# Patient Record
Sex: FEMALE | Race: OTHER | Employment: OTHER | URBAN - METROPOLITAN AREA
[De-identification: names, ages, dates, MRNs, and addresses within clinical notes are randomized per-mention and may not be internally consistent; named-entity substitution may affect disease eponyms.]

---

## 2017-01-11 ENCOUNTER — FOLLOW UP (OUTPATIENT)
Dept: URBAN - METROPOLITAN AREA CLINIC 33 | Facility: CLINIC | Age: 82
End: 2017-01-11

## 2017-01-11 VITALS
SYSTOLIC BLOOD PRESSURE: 130 MMHG | WEIGHT: 128 LBS | DIASTOLIC BLOOD PRESSURE: 84 MMHG | BODY MASS INDEX: 22.97 KG/M2 | HEART RATE: 78 BPM | HEIGHT: 62.5 IN

## 2017-01-11 DIAGNOSIS — H01.003: ICD-10-CM

## 2017-01-11 DIAGNOSIS — H40.1130: ICD-10-CM

## 2017-01-11 DIAGNOSIS — H01.006: ICD-10-CM

## 2017-01-11 DIAGNOSIS — H43.393: ICD-10-CM

## 2017-01-11 DIAGNOSIS — H35.3133: ICD-10-CM

## 2017-01-11 PROCEDURE — G8420 CALC BMI NORM PARAMETERS: HCPCS

## 2017-01-11 PROCEDURE — 4177F TALK PT/CRGVR RE AREDS PREV: CPT

## 2017-01-11 PROCEDURE — 92235 FLUORESCEIN ANGRPH MLTIFRAME: CPT

## 2017-01-11 PROCEDURE — G8427 DOCREV CUR MEDS BY ELIG CLIN: HCPCS

## 2017-01-11 PROCEDURE — 92250 FUNDUS PHOTOGRAPHY W/I&R: CPT

## 2017-01-11 PROCEDURE — 2027F OPTIC NERVE HEAD EVAL DONE: CPT

## 2017-01-11 PROCEDURE — 92014 COMPRE OPH EXAM EST PT 1/>: CPT

## 2017-01-11 PROCEDURE — 2019F DILATED MACUL EXAM DONE: CPT

## 2017-01-11 PROCEDURE — 1036F TOBACCO NON-USER: CPT

## 2017-01-11 ASSESSMENT — TONOMETRY
OS_IOP_MMHG: 11
OD_IOP_MMHG: 13

## 2017-01-11 ASSESSMENT — VISUAL ACUITY
OD_CC: 20/20-1
OS_CC: 20/30-1

## 2017-03-29 ENCOUNTER — UNSCHEDULED FOLLOW UP (OUTPATIENT)
Dept: URBAN - METROPOLITAN AREA CLINIC 33 | Facility: CLINIC | Age: 82
End: 2017-03-29

## 2017-03-29 VITALS
BODY MASS INDEX: 23 KG/M2 | HEIGHT: 62 IN | HEART RATE: 72 BPM | SYSTOLIC BLOOD PRESSURE: 108 MMHG | WEIGHT: 125 LBS | DIASTOLIC BLOOD PRESSURE: 72 MMHG

## 2017-03-29 DIAGNOSIS — H35.3211: ICD-10-CM

## 2017-03-29 DIAGNOSIS — H35.3123: ICD-10-CM

## 2017-03-29 DIAGNOSIS — H01.006: ICD-10-CM

## 2017-03-29 DIAGNOSIS — H01.003: ICD-10-CM

## 2017-03-29 DIAGNOSIS — H43.393: ICD-10-CM

## 2017-03-29 DIAGNOSIS — H40.1130: ICD-10-CM

## 2017-03-29 PROCEDURE — 92014 COMPRE OPH EXAM EST PT 1/>: CPT

## 2017-03-29 PROCEDURE — 4177F TALK PT/CRGVR RE AREDS PREV: CPT

## 2017-03-29 PROCEDURE — 92134 CPTRZ OPH DX IMG PST SGM RTA: CPT

## 2017-03-29 PROCEDURE — G8427 DOCREV CUR MEDS BY ELIG CLIN: HCPCS

## 2017-03-29 PROCEDURE — 2027F OPTIC NERVE HEAD EVAL DONE: CPT

## 2017-03-29 PROCEDURE — G8420 CALC BMI NORM PARAMETERS: HCPCS

## 2017-03-29 PROCEDURE — 67028 INJECTION EYE DRUG: CPT

## 2017-03-29 PROCEDURE — 2019F DILATED MACUL EXAM DONE: CPT

## 2017-03-29 PROCEDURE — 1036F TOBACCO NON-USER: CPT

## 2017-03-29 ASSESSMENT — TONOMETRY
OS_IOP_MMHG: 13
OD_IOP_MMHG: 14

## 2017-03-29 ASSESSMENT — VISUAL ACUITY
OD_CC: 20/40+1
OS_CC: 20/40+2

## 2017-04-10 ENCOUNTER — UNSCHEDULED FOLLOW UP (OUTPATIENT)
Dept: URBAN - METROPOLITAN AREA CLINIC 33 | Facility: CLINIC | Age: 82
End: 2017-04-10

## 2017-04-10 VITALS — HEIGHT: 55 IN | SYSTOLIC BLOOD PRESSURE: 132 MMHG | HEART RATE: 68 BPM | DIASTOLIC BLOOD PRESSURE: 64 MMHG

## 2017-04-10 DIAGNOSIS — H40.1130: ICD-10-CM

## 2017-04-10 DIAGNOSIS — H43.393: ICD-10-CM

## 2017-04-10 DIAGNOSIS — H35.3211: ICD-10-CM

## 2017-04-10 DIAGNOSIS — H35.3123: ICD-10-CM

## 2017-04-10 PROCEDURE — 1036F TOBACCO NON-USER: CPT

## 2017-04-10 PROCEDURE — 2027F OPTIC NERVE HEAD EVAL DONE: CPT

## 2017-04-10 PROCEDURE — 4177F TALK PT/CRGVR RE AREDS PREV: CPT

## 2017-04-10 PROCEDURE — G8427 DOCREV CUR MEDS BY ELIG CLIN: HCPCS

## 2017-04-10 PROCEDURE — 2019F DILATED MACUL EXAM DONE: CPT

## 2017-04-10 PROCEDURE — 92012 INTRM OPH EXAM EST PATIENT: CPT

## 2017-04-10 PROCEDURE — 92134 CPTRZ OPH DX IMG PST SGM RTA: CPT

## 2017-04-10 PROCEDURE — 92226 OPHTHALMOSCOPY (SUB): CPT

## 2017-04-10 ASSESSMENT — TONOMETRY
OD_IOP_MMHG: 12
OS_IOP_MMHG: 14

## 2017-04-10 ASSESSMENT — VISUAL ACUITY
OD_CC: 20/25-1
OS_CC: 20/40-2

## 2017-05-03 ENCOUNTER — FOLLOW UP (OUTPATIENT)
Dept: URBAN - METROPOLITAN AREA CLINIC 33 | Facility: CLINIC | Age: 82
End: 2017-05-03

## 2017-05-03 VITALS — HEART RATE: 76 BPM | DIASTOLIC BLOOD PRESSURE: 80 MMHG | HEIGHT: 55 IN | SYSTOLIC BLOOD PRESSURE: 110 MMHG

## 2017-05-03 DIAGNOSIS — H43.393: ICD-10-CM

## 2017-05-03 DIAGNOSIS — H01.003: ICD-10-CM

## 2017-05-03 DIAGNOSIS — H35.3123: ICD-10-CM

## 2017-05-03 DIAGNOSIS — H35.3211: ICD-10-CM

## 2017-05-03 DIAGNOSIS — H40.1130: ICD-10-CM

## 2017-05-03 DIAGNOSIS — H01.006: ICD-10-CM

## 2017-05-03 DIAGNOSIS — H25.11: ICD-10-CM

## 2017-05-03 PROCEDURE — 67028 INJECTION EYE DRUG: CPT

## 2017-05-03 PROCEDURE — 4177F TALK PT/CRGVR RE AREDS PREV: CPT

## 2017-05-03 PROCEDURE — 92012 INTRM OPH EXAM EST PATIENT: CPT

## 2017-05-03 PROCEDURE — 92235 FLUORESCEIN ANGRPH MLTIFRAME: CPT

## 2017-05-03 PROCEDURE — 92250 FUNDUS PHOTOGRAPHY W/I&R: CPT

## 2017-05-03 PROCEDURE — 1036F TOBACCO NON-USER: CPT

## 2017-05-03 PROCEDURE — G8427 DOCREV CUR MEDS BY ELIG CLIN: HCPCS

## 2017-05-03 PROCEDURE — 2019F DILATED MACUL EXAM DONE: CPT

## 2017-05-03 PROCEDURE — 2027F OPTIC NERVE HEAD EVAL DONE: CPT

## 2017-05-03 ASSESSMENT — TONOMETRY
OS_IOP_MMHG: 12
OD_IOP_MMHG: 12

## 2017-05-03 ASSESSMENT — VISUAL ACUITY
OS_CC: 20/40-1
OD_CC: 20/40+1

## 2017-11-01 ENCOUNTER — FOLLOW UP (OUTPATIENT)
Dept: URBAN - METROPOLITAN AREA CLINIC 33 | Facility: CLINIC | Age: 82
End: 2017-11-01

## 2017-11-01 VITALS — HEART RATE: 80 BPM | DIASTOLIC BLOOD PRESSURE: 74 MMHG | HEIGHT: 55 IN | SYSTOLIC BLOOD PRESSURE: 117 MMHG

## 2017-11-01 DIAGNOSIS — H35.3123: ICD-10-CM

## 2017-11-01 DIAGNOSIS — H25.11: ICD-10-CM

## 2017-11-01 DIAGNOSIS — H01.006: ICD-10-CM

## 2017-11-01 DIAGNOSIS — H35.3211: ICD-10-CM

## 2017-11-01 DIAGNOSIS — H01.003: ICD-10-CM

## 2017-11-01 DIAGNOSIS — H43.393: ICD-10-CM

## 2017-11-01 DIAGNOSIS — H40.1130: ICD-10-CM

## 2017-11-01 PROCEDURE — 2019F DILATED MACUL EXAM DONE: CPT

## 2017-11-01 PROCEDURE — 1036F TOBACCO NON-USER: CPT

## 2017-11-01 PROCEDURE — G8427 DOCREV CUR MEDS BY ELIG CLIN: HCPCS

## 2017-11-01 PROCEDURE — 2027F OPTIC NERVE HEAD EVAL DONE: CPT

## 2017-11-01 PROCEDURE — 4177F TALK PT/CRGVR RE AREDS PREV: CPT

## 2017-11-01 PROCEDURE — 67028 INJECTION EYE DRUG: CPT

## 2017-11-01 PROCEDURE — G8420 CALC BMI NORM PARAMETERS: HCPCS

## 2017-11-01 PROCEDURE — 92134 CPTRZ OPH DX IMG PST SGM RTA: CPT

## 2017-11-01 PROCEDURE — 92014 COMPRE OPH EXAM EST PT 1/>: CPT

## 2017-11-01 ASSESSMENT — VISUAL ACUITY
OS_SC: 20/40
OD_SC: 20/40-2

## 2017-11-01 ASSESSMENT — TONOMETRY
OS_IOP_MMHG: 13
OD_IOP_MMHG: 14

## 2017-11-14 ENCOUNTER — UNSCHEDULED FOLLOW UP (OUTPATIENT)
Dept: URBAN - METROPOLITAN AREA CLINIC 33 | Facility: CLINIC | Age: 82
End: 2017-11-14

## 2017-11-14 DIAGNOSIS — H01.003: ICD-10-CM

## 2017-11-14 DIAGNOSIS — H25.11: ICD-10-CM

## 2017-11-14 DIAGNOSIS — H43.393: ICD-10-CM

## 2017-11-14 DIAGNOSIS — H35.3211: ICD-10-CM

## 2017-11-14 DIAGNOSIS — H35.3123: ICD-10-CM

## 2017-11-14 DIAGNOSIS — H40.1130: ICD-10-CM

## 2017-11-14 DIAGNOSIS — H01.006: ICD-10-CM

## 2017-11-14 PROCEDURE — 4177F TALK PT/CRGVR RE AREDS PREV: CPT

## 2017-11-14 PROCEDURE — G8420 CALC BMI NORM PARAMETERS: HCPCS

## 2017-11-14 PROCEDURE — 92012 INTRM OPH EXAM EST PATIENT: CPT

## 2017-11-14 PROCEDURE — 1036F TOBACCO NON-USER: CPT

## 2017-11-14 PROCEDURE — 2027F OPTIC NERVE HEAD EVAL DONE: CPT

## 2017-11-14 PROCEDURE — G8427 DOCREV CUR MEDS BY ELIG CLIN: HCPCS

## 2017-11-14 PROCEDURE — 2019F DILATED MACUL EXAM DONE: CPT

## 2017-11-14 ASSESSMENT — VISUAL ACUITY
OS_SC: 20/40+1
OD_SC: 20/40-2

## 2017-11-14 ASSESSMENT — TONOMETRY
OS_IOP_MMHG: 11
OD_IOP_MMHG: 10

## 2017-11-15 ENCOUNTER — UNSCHEDULED FOLLOW UP (OUTPATIENT)
Dept: URBAN - METROPOLITAN AREA CLINIC 33 | Facility: CLINIC | Age: 82
End: 2017-11-15

## 2017-11-15 VITALS — SYSTOLIC BLOOD PRESSURE: 114 MMHG | HEIGHT: 55 IN | HEART RATE: 72 BPM | DIASTOLIC BLOOD PRESSURE: 60 MMHG

## 2017-11-15 DIAGNOSIS — H35.3211: ICD-10-CM

## 2017-11-15 DIAGNOSIS — H35.3123: ICD-10-CM

## 2017-11-15 DIAGNOSIS — H01.006: ICD-10-CM

## 2017-11-15 DIAGNOSIS — H25.11: ICD-10-CM

## 2017-11-15 DIAGNOSIS — H01.003: ICD-10-CM

## 2017-11-15 DIAGNOSIS — H40.1130: ICD-10-CM

## 2017-11-15 DIAGNOSIS — H43.393: ICD-10-CM

## 2017-11-15 PROCEDURE — G8420 CALC BMI NORM PARAMETERS: HCPCS

## 2017-11-15 PROCEDURE — 2027F OPTIC NERVE HEAD EVAL DONE: CPT

## 2017-11-15 PROCEDURE — 4177F TALK PT/CRGVR RE AREDS PREV: CPT

## 2017-11-15 PROCEDURE — G8427 DOCREV CUR MEDS BY ELIG CLIN: HCPCS

## 2017-11-15 PROCEDURE — 1036F TOBACCO NON-USER: CPT

## 2017-11-15 PROCEDURE — 2019F DILATED MACUL EXAM DONE: CPT

## 2017-11-15 PROCEDURE — 99212 OFFICE O/P EST SF 10 MIN: CPT

## 2017-11-15 ASSESSMENT — VISUAL ACUITY
OS_SC: 20/30+1
OD_SC: 20/30

## 2018-01-17 ENCOUNTER — FOLLOW UP (OUTPATIENT)
Dept: URBAN - METROPOLITAN AREA CLINIC 33 | Facility: CLINIC | Age: 83
End: 2018-01-17

## 2018-01-17 VITALS
HEIGHT: 62 IN | WEIGHT: 125 LBS | DIASTOLIC BLOOD PRESSURE: 74 MMHG | BODY MASS INDEX: 23 KG/M2 | SYSTOLIC BLOOD PRESSURE: 126 MMHG

## 2018-01-17 DIAGNOSIS — H01.006: ICD-10-CM

## 2018-01-17 DIAGNOSIS — H40.1130: ICD-10-CM

## 2018-01-17 DIAGNOSIS — H43.393: ICD-10-CM

## 2018-01-17 DIAGNOSIS — H35.3123: ICD-10-CM

## 2018-01-17 DIAGNOSIS — H25.11: ICD-10-CM

## 2018-01-17 DIAGNOSIS — H35.3211: ICD-10-CM

## 2018-01-17 DIAGNOSIS — H01.003: ICD-10-CM

## 2018-01-17 PROCEDURE — 92014 COMPRE OPH EXAM EST PT 1/>: CPT

## 2018-01-17 PROCEDURE — 67028 INJECTION EYE DRUG: CPT

## 2018-01-17 PROCEDURE — 92235 FLUORESCEIN ANGRPH MLTIFRAME: CPT

## 2018-01-17 PROCEDURE — 92134 CPTRZ OPH DX IMG PST SGM RTA: CPT

## 2018-01-17 PROCEDURE — 92250 FUNDUS PHOTOGRAPHY W/I&R: CPT

## 2018-01-17 RX ORDER — PREDNISONE 20MG 20 MG/1: 1 TABLET ORAL ONCE A DAY

## 2018-01-17 ASSESSMENT — VISUAL ACUITY
OD_SC: 20/40+1
OS_SC: 20/30-2

## 2018-01-17 ASSESSMENT — TONOMETRY
OD_IOP_MMHG: 10
OS_IOP_MMHG: 11

## 2018-03-28 ENCOUNTER — FOLLOW UP AND POST INJECTION EVALUATION (OUTPATIENT)
Dept: URBAN - METROPOLITAN AREA CLINIC 33 | Facility: CLINIC | Age: 83
End: 2018-03-28

## 2018-03-28 VITALS — HEIGHT: 55 IN | SYSTOLIC BLOOD PRESSURE: 118 MMHG | DIASTOLIC BLOOD PRESSURE: 70 MMHG | HEART RATE: 68 BPM

## 2018-03-28 DIAGNOSIS — H35.3123: ICD-10-CM

## 2018-03-28 DIAGNOSIS — H43.393: ICD-10-CM

## 2018-03-28 DIAGNOSIS — H40.1130: ICD-10-CM

## 2018-03-28 DIAGNOSIS — H10.33: ICD-10-CM

## 2018-03-28 DIAGNOSIS — H35.3211: ICD-10-CM

## 2018-03-28 PROCEDURE — 92250 FUNDUS PHOTOGRAPHY W/I&R: CPT

## 2018-03-28 PROCEDURE — 92012 INTRM OPH EXAM EST PATIENT: CPT

## 2018-03-28 PROCEDURE — 92134 CPTRZ OPH DX IMG PST SGM RTA: CPT

## 2018-03-28 PROCEDURE — 67028 INJECTION EYE DRUG: CPT

## 2018-03-28 ASSESSMENT — VISUAL ACUITY
OS_SC: 20/30+1
OD_PH: 20/40+1
OD_SC: 20/50-2

## 2018-03-28 ASSESSMENT — TONOMETRY
OS_IOP_MMHG: 11
OD_IOP_MMHG: 13

## 2018-04-02 ENCOUNTER — UNSCHEDULED FOLLOW UP (OUTPATIENT)
Dept: URBAN - METROPOLITAN AREA CLINIC 33 | Facility: CLINIC | Age: 83
End: 2018-04-02

## 2018-04-02 DIAGNOSIS — H35.3123: ICD-10-CM

## 2018-04-02 DIAGNOSIS — H35.3211: ICD-10-CM

## 2018-04-02 DIAGNOSIS — H40.1130: ICD-10-CM

## 2018-04-02 DIAGNOSIS — H43.393: ICD-10-CM

## 2018-04-02 PROCEDURE — 92226 OPHTHALMOSCOPY (SUB): CPT

## 2018-04-02 PROCEDURE — 92012 INTRM OPH EXAM EST PATIENT: CPT

## 2018-04-02 ASSESSMENT — TONOMETRY
OD_IOP_MMHG: 11
OS_IOP_MMHG: 11

## 2018-04-02 ASSESSMENT — VISUAL ACUITY
OS_SC: 20/40+1
OD_SC: 20/50+2

## 2018-05-02 ENCOUNTER — FOLLOW UP AND POST INJECTION EVALUATION (OUTPATIENT)
Dept: URBAN - METROPOLITAN AREA CLINIC 33 | Facility: CLINIC | Age: 83
End: 2018-05-02

## 2018-05-02 VITALS — HEART RATE: 69 BPM | HEIGHT: 55 IN | SYSTOLIC BLOOD PRESSURE: 115 MMHG | DIASTOLIC BLOOD PRESSURE: 76 MMHG

## 2018-05-02 DIAGNOSIS — H40.1130: ICD-10-CM

## 2018-05-02 DIAGNOSIS — H43.393: ICD-10-CM

## 2018-05-02 DIAGNOSIS — H35.3123: ICD-10-CM

## 2018-05-02 DIAGNOSIS — H35.3211: ICD-10-CM

## 2018-05-02 PROCEDURE — 92134 CPTRZ OPH DX IMG PST SGM RTA: CPT

## 2018-05-02 PROCEDURE — 92250 FUNDUS PHOTOGRAPHY W/I&R: CPT

## 2018-05-02 PROCEDURE — 92014 COMPRE OPH EXAM EST PT 1/>: CPT

## 2018-05-02 PROCEDURE — 67028 INJECTION EYE DRUG: CPT

## 2018-05-02 ASSESSMENT — TONOMETRY
OS_IOP_MMHG: 12
OD_IOP_MMHG: 10

## 2018-05-02 ASSESSMENT — VISUAL ACUITY
OD_SC: 20/40-2
OS_SC: 20/40-2

## 2018-11-05 ENCOUNTER — FOLLOW UP AND POST INJECTION EVALUATION (OUTPATIENT)
Dept: URBAN - METROPOLITAN AREA CLINIC 33 | Facility: CLINIC | Age: 83
End: 2018-11-05

## 2018-11-05 DIAGNOSIS — H40.1130: ICD-10-CM

## 2018-11-05 DIAGNOSIS — H35.3211: ICD-10-CM

## 2018-11-05 DIAGNOSIS — H01.003: ICD-10-CM

## 2018-11-05 DIAGNOSIS — H43.393: ICD-10-CM

## 2018-11-05 DIAGNOSIS — H25.11: ICD-10-CM

## 2018-11-05 DIAGNOSIS — H04.123: ICD-10-CM

## 2018-11-05 DIAGNOSIS — H01.006: ICD-10-CM

## 2018-11-05 DIAGNOSIS — H35.3123: ICD-10-CM

## 2018-11-05 PROCEDURE — 67028 INJECTION EYE DRUG: CPT

## 2018-11-05 PROCEDURE — 92134 CPTRZ OPH DX IMG PST SGM RTA: CPT

## 2018-11-05 PROCEDURE — 92250 FUNDUS PHOTOGRAPHY W/I&R: CPT

## 2018-11-05 PROCEDURE — 92014 COMPRE OPH EXAM EST PT 1/>: CPT

## 2018-11-05 ASSESSMENT — VISUAL ACUITY
OD_PH: 20/50+2
OS_CC: 20/30+1
OD_CC: 20/100-2

## 2018-11-05 ASSESSMENT — TONOMETRY
OD_IOP_MMHG: 11
OS_IOP_MMHG: 12

## 2018-11-07 ENCOUNTER — UNSCHEDULED FOLLOW UP (OUTPATIENT)
Dept: URBAN - METROPOLITAN AREA CLINIC 33 | Facility: CLINIC | Age: 83
End: 2018-11-07

## 2018-11-07 DIAGNOSIS — H40.1130: ICD-10-CM

## 2018-11-07 DIAGNOSIS — H43.393: ICD-10-CM

## 2018-11-07 DIAGNOSIS — H35.3123: ICD-10-CM

## 2018-11-07 DIAGNOSIS — H10.13: ICD-10-CM

## 2018-11-07 DIAGNOSIS — H35.3211: ICD-10-CM

## 2018-11-07 DIAGNOSIS — H11.31: ICD-10-CM

## 2018-11-07 PROCEDURE — 92012 INTRM OPH EXAM EST PATIENT: CPT

## 2018-11-07 ASSESSMENT — VISUAL ACUITY
OD_SC: 20/200+2
OS_SC: 20/50+2

## 2018-11-12 ENCOUNTER — IOP CHECK (OUTPATIENT)
Dept: URBAN - METROPOLITAN AREA CLINIC 33 | Facility: CLINIC | Age: 83
End: 2018-11-12

## 2018-11-12 DIAGNOSIS — H10.13: ICD-10-CM

## 2018-11-12 DIAGNOSIS — H43.393: ICD-10-CM

## 2018-11-12 DIAGNOSIS — H40.1130: ICD-10-CM

## 2018-11-12 DIAGNOSIS — H11.31: ICD-10-CM

## 2018-11-12 DIAGNOSIS — H35.3211: ICD-10-CM

## 2018-11-12 DIAGNOSIS — H35.3123: ICD-10-CM

## 2018-11-12 PROCEDURE — 99211 OFF/OP EST MAY X REQ PHY/QHP: CPT

## 2018-11-12 ASSESSMENT — VISUAL ACUITY
OS_CC: 20/40+2
OD_CC: 20/100+2

## 2019-02-13 ENCOUNTER — FOLLOW UP AND POST INJECTION EVALUATION (OUTPATIENT)
Dept: URBAN - METROPOLITAN AREA CLINIC 33 | Facility: CLINIC | Age: 84
End: 2019-02-13

## 2019-02-13 VITALS
HEIGHT: 62 IN | BODY MASS INDEX: 22.08 KG/M2 | DIASTOLIC BLOOD PRESSURE: 82 MMHG | WEIGHT: 120 LBS | HEART RATE: 70 BPM | SYSTOLIC BLOOD PRESSURE: 130 MMHG

## 2019-02-13 DIAGNOSIS — H35.3123: ICD-10-CM

## 2019-02-13 DIAGNOSIS — H40.1130: ICD-10-CM

## 2019-02-13 DIAGNOSIS — H35.3211: ICD-10-CM

## 2019-02-13 DIAGNOSIS — H10.13: ICD-10-CM

## 2019-02-13 DIAGNOSIS — H43.393: ICD-10-CM

## 2019-02-13 DIAGNOSIS — H11.31: ICD-10-CM

## 2019-02-13 PROCEDURE — 92014 COMPRE OPH EXAM EST PT 1/>: CPT

## 2019-02-13 PROCEDURE — 92134 CPTRZ OPH DX IMG PST SGM RTA: CPT

## 2019-02-13 PROCEDURE — 67028 INJECTION EYE DRUG: CPT

## 2019-02-13 ASSESSMENT — TONOMETRY
OD_IOP_MMHG: 12
OS_IOP_MMHG: 12

## 2019-02-13 ASSESSMENT — VISUAL ACUITY
OD_CC: 20/25-2
OS_CC: 20/80+1

## 2019-03-27 ENCOUNTER — FOLLOW UP AND POST INJECTION EVALUATION (OUTPATIENT)
Dept: URBAN - METROPOLITAN AREA CLINIC 33 | Facility: CLINIC | Age: 84
End: 2019-03-27

## 2019-03-27 DIAGNOSIS — H35.3211: ICD-10-CM

## 2019-03-27 DIAGNOSIS — H10.13: ICD-10-CM

## 2019-03-27 DIAGNOSIS — H35.3123: ICD-10-CM

## 2019-03-27 DIAGNOSIS — H11.31: ICD-10-CM

## 2019-03-27 DIAGNOSIS — H43.393: ICD-10-CM

## 2019-03-27 DIAGNOSIS — H40.1130: ICD-10-CM

## 2019-03-27 PROCEDURE — 92134 CPTRZ OPH DX IMG PST SGM RTA: CPT

## 2019-03-27 PROCEDURE — 67028 INJECTION EYE DRUG: CPT

## 2019-03-27 PROCEDURE — 92012 INTRM OPH EXAM EST PATIENT: CPT

## 2019-03-27 ASSESSMENT — TONOMETRY
OS_IOP_MMHG: 13
OD_IOP_MMHG: 11

## 2019-03-27 ASSESSMENT — VISUAL ACUITY
OS_SC: 20/40-2
OD_PH: 20/25-1
OD_SC: 20/40+2

## 2019-05-01 ENCOUNTER — CLINICAL PROCEDURE AND DIAGNOSTIC TESTING ONLY (OUTPATIENT)
Dept: URBAN - METROPOLITAN AREA CLINIC 33 | Facility: CLINIC | Age: 84
End: 2019-05-01

## 2019-05-01 DIAGNOSIS — H35.3211: ICD-10-CM

## 2019-05-01 DIAGNOSIS — H35.3123: ICD-10-CM

## 2019-05-01 PROCEDURE — 92134 CPTRZ OPH DX IMG PST SGM RTA: CPT

## 2019-05-01 PROCEDURE — 67028 INJECTION EYE DRUG: CPT

## 2019-05-01 ASSESSMENT — TONOMETRY
OD_IOP_MMHG: 12
OS_IOP_MMHG: 12

## 2019-05-01 ASSESSMENT — VISUAL ACUITY
OD_SC: 20/30-2
OS_SC: 20/50+2

## 2019-11-04 ENCOUNTER — FOLLOW UP AND POST INJECTION EVALUATION (OUTPATIENT)
Dept: URBAN - METROPOLITAN AREA CLINIC 33 | Facility: CLINIC | Age: 84
End: 2019-11-04

## 2019-11-04 DIAGNOSIS — H43.393: ICD-10-CM

## 2019-11-04 DIAGNOSIS — H40.1130: ICD-10-CM

## 2019-11-04 DIAGNOSIS — H11.31: ICD-10-CM

## 2019-11-04 DIAGNOSIS — H35.3123: ICD-10-CM

## 2019-11-04 DIAGNOSIS — H35.3211: ICD-10-CM

## 2019-11-04 DIAGNOSIS — H10.13: ICD-10-CM

## 2019-11-04 PROCEDURE — 92014 COMPRE OPH EXAM EST PT 1/>: CPT

## 2019-11-04 PROCEDURE — 92250 FUNDUS PHOTOGRAPHY W/I&R: CPT

## 2019-11-04 PROCEDURE — 67028 INJECTION EYE DRUG: CPT

## 2019-11-04 PROCEDURE — 92134 CPTRZ OPH DX IMG PST SGM RTA: CPT

## 2019-11-04 ASSESSMENT — TONOMETRY
OS_IOP_MMHG: 10
OD_IOP_MMHG: 10

## 2019-11-04 ASSESSMENT — VISUAL ACUITY
OD_CC: 20/25-1
OS_CC: 20/50-2

## 2020-01-13 ENCOUNTER — CLINICAL PROCEDURE AND DIAGNOSTIC TESTING ONLY (OUTPATIENT)
Dept: URBAN - METROPOLITAN AREA CLINIC 33 | Facility: CLINIC | Age: 85
End: 2020-01-13

## 2020-01-13 DIAGNOSIS — H10.13: ICD-10-CM

## 2020-01-13 DIAGNOSIS — H35.3211: ICD-10-CM

## 2020-01-13 DIAGNOSIS — H11.31: ICD-10-CM

## 2020-01-13 DIAGNOSIS — H43.393: ICD-10-CM

## 2020-01-13 DIAGNOSIS — H35.3123: ICD-10-CM

## 2020-01-13 DIAGNOSIS — H40.1130: ICD-10-CM

## 2020-01-13 PROCEDURE — 92250 FUNDUS PHOTOGRAPHY W/I&R: CPT

## 2020-01-13 PROCEDURE — 92012 INTRM OPH EXAM EST PATIENT: CPT

## 2020-01-13 PROCEDURE — 92134 CPTRZ OPH DX IMG PST SGM RTA: CPT

## 2020-01-13 PROCEDURE — 67028 INJECTION EYE DRUG: CPT

## 2020-01-13 ASSESSMENT — VISUAL ACUITY
OS_CC: 20/30-2
OD_CC: 20/30+2

## 2020-01-13 ASSESSMENT — TONOMETRY
OD_IOP_MMHG: 13
OS_IOP_MMHG: 14

## 2020-02-17 ENCOUNTER — CLINICAL PROCEDURE AND DIAGNOSTIC TESTING ONLY (OUTPATIENT)
Dept: URBAN - METROPOLITAN AREA CLINIC 33 | Facility: CLINIC | Age: 85
End: 2020-02-17

## 2020-02-17 DIAGNOSIS — H35.3211: ICD-10-CM

## 2020-02-17 DIAGNOSIS — H35.3123: ICD-10-CM

## 2020-02-17 PROCEDURE — 92134 CPTRZ OPH DX IMG PST SGM RTA: CPT

## 2020-02-17 PROCEDURE — 92250 FUNDUS PHOTOGRAPHY W/I&R: CPT

## 2020-02-17 PROCEDURE — 67028 INJECTION EYE DRUG: CPT

## 2020-02-17 ASSESSMENT — VISUAL ACUITY
OS_CC: 20/30-2
OD_CC: 20/30+2

## 2020-02-17 ASSESSMENT — TONOMETRY
OS_IOP_MMHG: 12
OD_IOP_MMHG: 11

## 2020-03-23 ENCOUNTER — CLINICAL PROCEDURE AND DIAGNOSTIC TESTING ONLY (OUTPATIENT)
Dept: URBAN - METROPOLITAN AREA CLINIC 33 | Facility: CLINIC | Age: 85
End: 2020-03-23

## 2020-03-23 DIAGNOSIS — H35.3211: ICD-10-CM

## 2020-03-23 DIAGNOSIS — H35.3123: ICD-10-CM

## 2020-03-23 PROCEDURE — 67028 INJECTION EYE DRUG: CPT

## 2020-03-23 PROCEDURE — 92250 FUNDUS PHOTOGRAPHY W/I&R: CPT

## 2020-03-23 ASSESSMENT — VISUAL ACUITY
OS_CC: 20/40-2
OD_CC: 20/20-2

## 2020-03-23 ASSESSMENT — TONOMETRY
OD_IOP_MMHG: 14
OS_IOP_MMHG: 12

## 2020-04-27 ENCOUNTER — FOLLOW UP AND POST INJECTION EVALUATION (OUTPATIENT)
Dept: URBAN - METROPOLITAN AREA CLINIC 33 | Facility: CLINIC | Age: 85
End: 2020-04-27

## 2020-04-27 DIAGNOSIS — H35.3123: ICD-10-CM

## 2020-04-27 DIAGNOSIS — H11.31: ICD-10-CM

## 2020-04-27 DIAGNOSIS — H10.13: ICD-10-CM

## 2020-04-27 DIAGNOSIS — H40.1130: ICD-10-CM

## 2020-04-27 DIAGNOSIS — H43.393: ICD-10-CM

## 2020-04-27 DIAGNOSIS — H35.3211: ICD-10-CM

## 2020-04-27 PROCEDURE — 67028 INJECTION EYE DRUG: CPT

## 2020-04-27 PROCEDURE — 92250 FUNDUS PHOTOGRAPHY W/I&R: CPT

## 2020-04-27 PROCEDURE — 92134 CPTRZ OPH DX IMG PST SGM RTA: CPT

## 2020-04-27 PROCEDURE — 92012 INTRM OPH EXAM EST PATIENT: CPT

## 2020-04-27 ASSESSMENT — TONOMETRY
OS_IOP_MMHG: 11
OD_IOP_MMHG: 11

## 2020-04-27 ASSESSMENT — VISUAL ACUITY
OD_CC: 20/20-2
OS_CC: 20/40-1

## 2020-11-12 NOTE — PATIENT DISCUSSION
Advised patient this could take a few weeks to resolve. If no improvement discussed possible ocular steroid vs steroid injection.

## 2020-11-18 ENCOUNTER — FOLLOW UP AND POST INJECTION EVALUATION (OUTPATIENT)
Dept: URBAN - METROPOLITAN AREA CLINIC 33 | Facility: CLINIC | Age: 85
End: 2020-11-18

## 2020-11-18 VITALS — WEIGHT: 122 LBS | HEIGHT: 62 IN | BODY MASS INDEX: 22.45 KG/M2

## 2020-11-18 DIAGNOSIS — H35.3123: ICD-10-CM

## 2020-11-18 DIAGNOSIS — H35.3211: ICD-10-CM

## 2020-11-18 DIAGNOSIS — H43.393: ICD-10-CM

## 2020-11-18 DIAGNOSIS — H10.13: ICD-10-CM

## 2020-11-18 DIAGNOSIS — H11.31: ICD-10-CM

## 2020-11-18 DIAGNOSIS — H40.1130: ICD-10-CM

## 2020-11-18 PROCEDURE — 67028 INJECTION EYE DRUG: CPT

## 2020-11-18 PROCEDURE — 92134 CPTRZ OPH DX IMG PST SGM RTA: CPT

## 2020-11-18 PROCEDURE — 92012 INTRM OPH EXAM EST PATIENT: CPT

## 2020-11-18 PROCEDURE — 92250 FUNDUS PHOTOGRAPHY W/I&R: CPT

## 2020-11-18 ASSESSMENT — VISUAL ACUITY
OS_CC: 20/40-2
OD_CC: 20/25

## 2020-11-18 ASSESSMENT — TONOMETRY
OD_IOP_MMHG: 11
OS_IOP_MMHG: 13

## 2020-12-28 ENCOUNTER — FOLLOW UP AND POST INJECTION EVALUATION (OUTPATIENT)
Dept: URBAN - METROPOLITAN AREA CLINIC 33 | Facility: CLINIC | Age: 85
End: 2020-12-28

## 2020-12-28 DIAGNOSIS — H04.123: ICD-10-CM

## 2020-12-28 DIAGNOSIS — H10.13: ICD-10-CM

## 2020-12-28 DIAGNOSIS — H26.491: ICD-10-CM

## 2020-12-28 DIAGNOSIS — H01.003: ICD-10-CM

## 2020-12-28 DIAGNOSIS — H40.1130: ICD-10-CM

## 2020-12-28 DIAGNOSIS — H01.006: ICD-10-CM

## 2020-12-28 DIAGNOSIS — H43.393: ICD-10-CM

## 2020-12-28 DIAGNOSIS — H35.3211: ICD-10-CM

## 2020-12-28 DIAGNOSIS — H11.31: ICD-10-CM

## 2020-12-28 DIAGNOSIS — H35.3123: ICD-10-CM

## 2020-12-28 PROCEDURE — 92250 FUNDUS PHOTOGRAPHY W/I&R: CPT

## 2020-12-28 PROCEDURE — 92134 CPTRZ OPH DX IMG PST SGM RTA: CPT

## 2020-12-28 PROCEDURE — 92012 INTRM OPH EXAM EST PATIENT: CPT

## 2020-12-28 PROCEDURE — 67028 INJECTION EYE DRUG: CPT

## 2020-12-28 ASSESSMENT — TONOMETRY
OD_IOP_MMHG: 10
OS_IOP_MMHG: 12

## 2020-12-28 ASSESSMENT — VISUAL ACUITY
OD_CC: 20/40-1
OS_CC: 20/50-2

## 2021-01-28 ENCOUNTER — NEW PATIENT COMPREHENSIVE (OUTPATIENT)
Dept: URBAN - METROPOLITAN AREA CLINIC 32 | Facility: CLINIC | Age: 86
End: 2021-01-28

## 2021-01-28 DIAGNOSIS — H26.491: ICD-10-CM

## 2021-01-28 PROCEDURE — 92004 COMPRE OPH EXAM NEW PT 1/>: CPT

## 2021-01-28 ASSESSMENT — KERATOMETRY
OS_K1POWER_DIOPTERS: 47.00
OS_K2POWER_DIOPTERS: 45.25
OD_AXISANGLE_DEGREES: 21
OS_AXISANGLE2_DEGREES: 73
OD_AXISANGLE2_DEGREES: 111
OD_K1POWER_DIOPTERS: 45.50
OD_K2POWER_DIOPTERS: 44.75
OS_AXISANGLE_DEGREES: 163

## 2021-01-28 ASSESSMENT — VISUAL ACUITY
OD_CC: 20/30-1
OS_CC: 20/70
OU_CC: J1+
OD_GLARE: 20/400
OS_GLARE: 20/400

## 2021-01-28 ASSESSMENT — TONOMETRY
OD_IOP_MMHG: 14
OS_IOP_MMHG: 13

## 2021-02-01 ENCOUNTER — CLINICAL PROCEDURE AND DIAGNOSTIC TESTING ONLY (OUTPATIENT)
Dept: URBAN - METROPOLITAN AREA CLINIC 33 | Facility: CLINIC | Age: 86
End: 2021-02-01

## 2021-02-01 DIAGNOSIS — H35.3123: ICD-10-CM

## 2021-02-01 DIAGNOSIS — H35.3211: ICD-10-CM

## 2021-02-01 PROCEDURE — 92250 FUNDUS PHOTOGRAPHY W/I&R: CPT

## 2021-02-01 PROCEDURE — 92134 CPTRZ OPH DX IMG PST SGM RTA: CPT

## 2021-02-01 PROCEDURE — 67028 INJECTION EYE DRUG: CPT

## 2021-02-15 ENCOUNTER — SURGERY/PROCEDURE (OUTPATIENT)
Dept: URBAN - METROPOLITAN AREA CLINIC 32 | Facility: CLINIC | Age: 86
End: 2021-02-15

## 2021-02-15 DIAGNOSIS — H26.491: ICD-10-CM

## 2021-02-15 PROCEDURE — 66821 AFTER CATARACT LASER SURGERY: CPT

## 2021-02-18 ASSESSMENT — KERATOMETRY
OD_AXISANGLE_DEGREES: 21
OD_K1POWER_DIOPTERS: 45.50
OS_AXISANGLE_DEGREES: 163
OD_AXISANGLE2_DEGREES: 111
OS_AXISANGLE2_DEGREES: 73
OS_K2POWER_DIOPTERS: 45.25
OS_K1POWER_DIOPTERS: 47.00
OD_K2POWER_DIOPTERS: 44.75

## 2021-03-05 ASSESSMENT — VISUAL ACUITY
OD_PH: 20/25
OD_CC: 20/50
OS_CC: 20/25

## 2021-03-05 ASSESSMENT — KERATOMETRY
OS_K1POWER_DIOPTERS: 47.00
OD_AXISANGLE_DEGREES: 21
OD_K1POWER_DIOPTERS: 45.50
OD_AXISANGLE2_DEGREES: 111
OS_AXISANGLE2_DEGREES: 73
OD_K2POWER_DIOPTERS: 44.75
OS_K2POWER_DIOPTERS: 45.25
OS_AXISANGLE_DEGREES: 163

## 2021-03-08 ENCOUNTER — CLINICAL PROCEDURE AND DIAGNOSTIC TESTING ONLY (OUTPATIENT)
Dept: URBAN - METROPOLITAN AREA CLINIC 33 | Facility: CLINIC | Age: 86
End: 2021-03-08

## 2021-03-08 ENCOUNTER — YAG POST-OP (OUTPATIENT)
Dept: URBAN - METROPOLITAN AREA CLINIC 32 | Facility: CLINIC | Age: 86
End: 2021-03-08

## 2021-03-08 DIAGNOSIS — Z98.890: ICD-10-CM

## 2021-03-08 DIAGNOSIS — H35.3123: ICD-10-CM

## 2021-03-08 DIAGNOSIS — H35.3211: ICD-10-CM

## 2021-03-08 PROCEDURE — 92134 CPTRZ OPH DX IMG PST SGM RTA: CPT

## 2021-03-08 PROCEDURE — 99024 POSTOP FOLLOW-UP VISIT: CPT

## 2021-03-08 PROCEDURE — 67028 INJECTION EYE DRUG: CPT

## 2021-03-08 PROCEDURE — 92250 FUNDUS PHOTOGRAPHY W/I&R: CPT

## 2021-03-08 PROCEDURE — 92015 DETERMINE REFRACTIVE STATE: CPT

## 2021-03-08 ASSESSMENT — TONOMETRY
OS_IOP_MMHG: 13
OD_IOP_MMHG: 12
OD_IOP_MMHG: 15
OS_IOP_MMHG: 14

## 2021-04-12 ENCOUNTER — FOLLOW UP AND POST INJECTION EVALUATION (OUTPATIENT)
Dept: URBAN - METROPOLITAN AREA CLINIC 33 | Facility: CLINIC | Age: 86
End: 2021-04-12

## 2021-04-12 DIAGNOSIS — H35.3211: ICD-10-CM

## 2021-04-12 DIAGNOSIS — H35.3123: ICD-10-CM

## 2021-04-12 DIAGNOSIS — H40.1130: ICD-10-CM

## 2021-04-12 DIAGNOSIS — H43.393: ICD-10-CM

## 2021-04-12 PROCEDURE — 92250 FUNDUS PHOTOGRAPHY W/I&R: CPT

## 2021-04-12 PROCEDURE — 92012 INTRM OPH EXAM EST PATIENT: CPT

## 2021-04-12 PROCEDURE — 67028 INJECTION EYE DRUG: CPT

## 2021-04-12 PROCEDURE — 92134 CPTRZ OPH DX IMG PST SGM RTA: CPT

## 2021-04-12 RX ORDER — TIMOLOL 2.56 MG/ML: 1 SOLUTION/ DROPS OPHTHALMIC EVERY MORNING

## 2021-04-12 ASSESSMENT — VISUAL ACUITY
OD_CC: 20/30+1
OS_CC: 20/60-2

## 2021-04-12 ASSESSMENT — TONOMETRY
OS_IOP_MMHG: 14
OD_IOP_MMHG: 16

## 2021-05-17 ENCOUNTER — CLINICAL PROCEDURE AND DIAGNOSTIC TESTING ONLY (OUTPATIENT)
Dept: URBAN - METROPOLITAN AREA CLINIC 33 | Facility: CLINIC | Age: 86
End: 2021-05-17

## 2021-05-17 DIAGNOSIS — H35.3123: ICD-10-CM

## 2021-05-17 DIAGNOSIS — H35.3211: ICD-10-CM

## 2021-05-17 PROCEDURE — 92250 FUNDUS PHOTOGRAPHY W/I&R: CPT

## 2021-05-17 PROCEDURE — 67028 INJECTION EYE DRUG: CPT

## 2021-05-17 PROCEDURE — 92134 CPTRZ OPH DX IMG PST SGM RTA: CPT

## 2021-12-15 ENCOUNTER — FOLLOW UP (OUTPATIENT)
Dept: URBAN - METROPOLITAN AREA CLINIC 33 | Facility: CLINIC | Age: 86
End: 2021-12-15

## 2021-12-15 DIAGNOSIS — H40.1130: ICD-10-CM

## 2021-12-15 DIAGNOSIS — H10.13: ICD-10-CM

## 2021-12-15 DIAGNOSIS — H35.3123: ICD-10-CM

## 2021-12-15 DIAGNOSIS — H43.393: ICD-10-CM

## 2021-12-15 DIAGNOSIS — H04.123: ICD-10-CM

## 2021-12-15 DIAGNOSIS — H35.3211: ICD-10-CM

## 2021-12-15 PROCEDURE — 92134 CPTRZ OPH DX IMG PST SGM RTA: CPT

## 2021-12-15 PROCEDURE — 92014 COMPRE OPH EXAM EST PT 1/>: CPT

## 2021-12-15 PROCEDURE — 67028 INJECTION EYE DRUG: CPT

## 2021-12-15 ASSESSMENT — VISUAL ACUITY
OD_CC: 20/30-2
OS_CC: 20/400-1
OS_PH: 20/400+2

## 2021-12-15 ASSESSMENT — TONOMETRY
OS_IOP_MMHG: 11
OD_IOP_MMHG: 6

## 2022-01-04 ENCOUNTER — EMERGENCY VISIT (OUTPATIENT)
Dept: URBAN - METROPOLITAN AREA CLINIC 26 | Facility: CLINIC | Age: 87
End: 2022-01-04

## 2022-01-04 VITALS — HEIGHT: 62 IN | BODY MASS INDEX: 22.45 KG/M2 | WEIGHT: 122 LBS

## 2022-01-04 DIAGNOSIS — H57.12: ICD-10-CM

## 2022-01-04 DIAGNOSIS — H40.1130: ICD-10-CM

## 2022-01-04 DIAGNOSIS — H35.3123: ICD-10-CM

## 2022-01-04 DIAGNOSIS — H43.393: ICD-10-CM

## 2022-01-04 DIAGNOSIS — H04.123: ICD-10-CM

## 2022-01-04 DIAGNOSIS — H35.3211: ICD-10-CM

## 2022-01-04 DIAGNOSIS — H10.13: ICD-10-CM

## 2022-01-04 PROCEDURE — 92012 INTRM OPH EXAM EST PATIENT: CPT

## 2022-01-04 RX ORDER — MOXIFLOXACIN HYDROCHLORIDE 5 MG/ML: 1 SOLUTION/ DROPS OPHTHALMIC

## 2022-01-04 ASSESSMENT — TONOMETRY
OD_IOP_MMHG: 13
OS_IOP_MMHG: 15

## 2022-01-04 ASSESSMENT — VISUAL ACUITY
OS_CC: 20/400-2
OD_CC: 20/25-1

## 2022-01-06 ENCOUNTER — EMERGENCY VISIT (OUTPATIENT)
Dept: URBAN - METROPOLITAN AREA CLINIC 32 | Facility: CLINIC | Age: 87
End: 2022-01-06

## 2022-01-06 DIAGNOSIS — H16.142: ICD-10-CM

## 2022-01-06 PROCEDURE — 92012 INTRM OPH EXAM EST PATIENT: CPT

## 2022-01-06 ASSESSMENT — TONOMETRY
OD_IOP_MMHG: 9
OS_IOP_MMHG: 15

## 2022-01-06 ASSESSMENT — VISUAL ACUITY
OD_CC: 20/30
OS_CC: 20/400

## 2022-01-26 ENCOUNTER — CLINIC PROCEDURE ONLY (OUTPATIENT)
Dept: URBAN - METROPOLITAN AREA CLINIC 33 | Facility: CLINIC | Age: 87
End: 2022-01-26

## 2022-01-26 DIAGNOSIS — H35.3211: ICD-10-CM

## 2022-01-26 DIAGNOSIS — H35.3123: ICD-10-CM

## 2022-01-26 PROCEDURE — 67028 INJECTION EYE DRUG: CPT

## 2022-01-26 PROCEDURE — 92250 FUNDUS PHOTOGRAPHY W/I&R: CPT

## 2022-01-26 PROCEDURE — 92134 CPTRZ OPH DX IMG PST SGM RTA: CPT

## 2022-03-02 ENCOUNTER — CLINIC PROCEDURE ONLY (OUTPATIENT)
Dept: URBAN - METROPOLITAN AREA CLINIC 33 | Facility: CLINIC | Age: 87
End: 2022-03-02

## 2022-03-02 DIAGNOSIS — H35.3123: ICD-10-CM

## 2022-03-02 DIAGNOSIS — H35.3211: ICD-10-CM

## 2022-03-02 PROCEDURE — 67028 INJECTION EYE DRUG: CPT

## 2022-03-02 PROCEDURE — 92250 FUNDUS PHOTOGRAPHY W/I&R: CPT

## 2022-03-02 PROCEDURE — 92134 CPTRZ OPH DX IMG PST SGM RTA: CPT

## 2022-03-02 ASSESSMENT — TONOMETRY
OD_IOP_MMHG: 10
OS_IOP_MMHG: 10

## 2022-03-03 ENCOUNTER — EMERGENCY VISIT (OUTPATIENT)
Dept: URBAN - METROPOLITAN AREA CLINIC 33 | Facility: CLINIC | Age: 87
End: 2022-03-03

## 2022-03-03 DIAGNOSIS — H10.13: ICD-10-CM

## 2022-03-03 DIAGNOSIS — H43.393: ICD-10-CM

## 2022-03-03 DIAGNOSIS — H35.3123: ICD-10-CM

## 2022-03-03 DIAGNOSIS — H04.123: ICD-10-CM

## 2022-03-03 DIAGNOSIS — H40.1130: ICD-10-CM

## 2022-03-03 DIAGNOSIS — H35.3211: ICD-10-CM

## 2022-03-03 DIAGNOSIS — H16.142: ICD-10-CM

## 2022-03-03 PROCEDURE — 92012 INTRM OPH EXAM EST PATIENT: CPT

## 2022-03-03 ASSESSMENT — VISUAL ACUITY
OD_CC: 20/25-2
OS_CC: CF 2FT

## 2022-03-03 ASSESSMENT — TONOMETRY
OS_IOP_MMHG: 12
OD_IOP_MMHG: 11

## 2022-04-04 ENCOUNTER — EMERGENCY VISIT (OUTPATIENT)
Dept: URBAN - METROPOLITAN AREA CLINIC 32 | Facility: CLINIC | Age: 87
End: 2022-04-04

## 2022-04-04 DIAGNOSIS — H40.1130: ICD-10-CM

## 2022-04-04 DIAGNOSIS — H16.142: ICD-10-CM

## 2022-04-04 PROCEDURE — 99212 OFFICE O/P EST SF 10 MIN: CPT

## 2022-04-04 ASSESSMENT — VISUAL ACUITY
OS_CC: CF 3FT
OD_CC: 20/25-2

## 2022-04-06 ENCOUNTER — CLINIC PROCEDURE ONLY (OUTPATIENT)
Dept: URBAN - METROPOLITAN AREA CLINIC 33 | Facility: CLINIC | Age: 87
End: 2022-04-06

## 2022-04-06 DIAGNOSIS — H35.3123: ICD-10-CM

## 2022-04-06 DIAGNOSIS — H35.3211: ICD-10-CM

## 2022-04-06 PROCEDURE — 92134 CPTRZ OPH DX IMG PST SGM RTA: CPT

## 2022-04-06 PROCEDURE — 67028 INJECTION EYE DRUG: CPT

## 2022-04-06 PROCEDURE — 92250 FUNDUS PHOTOGRAPHY W/I&R: CPT

## 2022-04-06 ASSESSMENT — TONOMETRY: OS_IOP_MMHG: 12

## 2022-05-10 ENCOUNTER — CLINIC PROCEDURE ONLY (OUTPATIENT)
Dept: URBAN - METROPOLITAN AREA CLINIC 33 | Facility: CLINIC | Age: 87
End: 2022-05-10

## 2022-05-10 DIAGNOSIS — H35.3211: ICD-10-CM

## 2022-05-10 DIAGNOSIS — H35.3123: ICD-10-CM

## 2022-05-10 PROCEDURE — 92250 FUNDUS PHOTOGRAPHY W/I&R: CPT

## 2022-05-10 PROCEDURE — 67028 INJECTION EYE DRUG: CPT

## 2022-05-10 PROCEDURE — 92134 CPTRZ OPH DX IMG PST SGM RTA: CPT

## 2022-05-10 ASSESSMENT — TONOMETRY
OS_IOP_MMHG: 12
OD_IOP_MMHG: 9

## 2022-11-23 ENCOUNTER — COMPREHENSIVE EXAM (OUTPATIENT)
Dept: URBAN - METROPOLITAN AREA CLINIC 33 | Facility: CLINIC | Age: 87
End: 2022-11-23

## 2022-11-23 DIAGNOSIS — H35.3211: ICD-10-CM

## 2022-11-23 DIAGNOSIS — H04.123: ICD-10-CM

## 2022-11-23 DIAGNOSIS — H01.006: ICD-10-CM

## 2022-11-23 DIAGNOSIS — H01.003: ICD-10-CM

## 2022-11-23 DIAGNOSIS — H43.393: ICD-10-CM

## 2022-11-23 DIAGNOSIS — H16.142: ICD-10-CM

## 2022-11-23 DIAGNOSIS — H35.3123: ICD-10-CM

## 2022-11-23 DIAGNOSIS — H40.1130: ICD-10-CM

## 2022-11-23 DIAGNOSIS — H10.13: ICD-10-CM

## 2022-11-23 PROCEDURE — 67028 INJECTION EYE DRUG: CPT

## 2022-11-23 PROCEDURE — 92250 FUNDUS PHOTOGRAPHY W/I&R: CPT

## 2022-11-23 PROCEDURE — 92134 CPTRZ OPH DX IMG PST SGM RTA: CPT

## 2022-11-23 PROCEDURE — 92014 COMPRE OPH EXAM EST PT 1/>: CPT

## 2022-11-23 ASSESSMENT — VISUAL ACUITY
OD_PH: 20/40
OD_CC: 20/50-1
OS_CC: 20/400-1

## 2022-11-23 ASSESSMENT — TONOMETRY
OD_IOP_MMHG: 05
OS_IOP_MMHG: 09

## 2022-12-14 ENCOUNTER — EMERGENCY VISIT (OUTPATIENT)
Dept: URBAN - METROPOLITAN AREA CLINIC 33 | Facility: CLINIC | Age: 87
End: 2022-12-14

## 2022-12-14 PROCEDURE — 92014 COMPRE OPH EXAM EST PT 1/>: CPT

## 2022-12-14 ASSESSMENT — VISUAL ACUITY
OS_CC: 20/400-1
OD_CC: 20/30+2

## 2022-12-14 ASSESSMENT — TONOMETRY
OS_IOP_MMHG: 13
OD_IOP_MMHG: 12

## 2023-01-04 ENCOUNTER — CLINIC PROCEDURE ONLY (OUTPATIENT)
Dept: URBAN - METROPOLITAN AREA CLINIC 33 | Facility: CLINIC | Age: 88
End: 2023-01-04

## 2023-01-04 DIAGNOSIS — H35.3211: ICD-10-CM

## 2023-01-04 DIAGNOSIS — H35.3123: ICD-10-CM

## 2023-01-04 PROCEDURE — 92134 CPTRZ OPH DX IMG PST SGM RTA: CPT

## 2023-01-04 PROCEDURE — 67028 INJECTION EYE DRUG: CPT

## 2023-01-31 NOTE — PATIENT DISCUSSION
Recommend patient follow up with [facility] for further evaluation and potential treatment.

## 2023-04-18 ENCOUNTER — FOLLOW UP (OUTPATIENT)
Dept: URBAN - METROPOLITAN AREA CLINIC 33 | Facility: CLINIC | Age: 88
End: 2023-04-18

## 2023-04-18 VITALS — BODY MASS INDEX: 21.16 KG/M2 | HEIGHT: 62 IN | WEIGHT: 115 LBS

## 2023-04-18 DIAGNOSIS — H01.005: ICD-10-CM

## 2023-04-18 DIAGNOSIS — H43.393: ICD-10-CM

## 2023-04-18 DIAGNOSIS — H40.1130: ICD-10-CM

## 2023-04-18 DIAGNOSIS — H16.142: ICD-10-CM

## 2023-04-18 DIAGNOSIS — H10.33: ICD-10-CM

## 2023-04-18 DIAGNOSIS — H10.13: ICD-10-CM

## 2023-04-18 DIAGNOSIS — H35.3133: ICD-10-CM

## 2023-04-18 DIAGNOSIS — H01.003: ICD-10-CM

## 2023-04-18 DIAGNOSIS — H35.3211: ICD-10-CM

## 2023-04-18 DIAGNOSIS — H01.006: ICD-10-CM

## 2023-04-18 DIAGNOSIS — H04.123: ICD-10-CM

## 2023-04-18 PROCEDURE — 92014 COMPRE OPH EXAM EST PT 1/>: CPT

## 2023-04-18 PROCEDURE — 67028 INJECTION EYE DRUG: CPT

## 2023-04-18 PROCEDURE — 92134 CPTRZ OPH DX IMG PST SGM RTA: CPT

## 2023-04-18 PROCEDURE — 92250 FUNDUS PHOTOGRAPHY W/I&R: CPT

## 2023-04-18 ASSESSMENT — TONOMETRY
OS_IOP_MMHG: 11
OD_IOP_MMHG: 11

## 2023-04-18 ASSESSMENT — VISUAL ACUITY
OD_SC: 20/60+2
OS_SC: CF 2FT

## 2023-05-02 ENCOUNTER — EMERGENCY VISIT (OUTPATIENT)
Dept: URBAN - METROPOLITAN AREA CLINIC 26 | Facility: CLINIC | Age: 88
End: 2023-05-02

## 2023-05-02 DIAGNOSIS — H35.3211: ICD-10-CM

## 2023-05-02 DIAGNOSIS — H43.393: ICD-10-CM

## 2023-05-02 DIAGNOSIS — H04.123: ICD-10-CM

## 2023-05-02 DIAGNOSIS — H35.3133: ICD-10-CM

## 2023-05-02 DIAGNOSIS — H10.13: ICD-10-CM

## 2023-05-02 DIAGNOSIS — H40.1130: ICD-10-CM

## 2023-05-02 DIAGNOSIS — H11.31: ICD-10-CM

## 2023-05-02 PROCEDURE — 92012 INTRM OPH EXAM EST PATIENT: CPT

## 2023-05-02 RX ORDER — FLUOROMETHOLONE 2.5 MG/ML: 1 SUSPENSION/ DROPS OPHTHALMIC TWICE A DAY

## 2023-05-02 ASSESSMENT — VISUAL ACUITY
OS_CC: 20/400-1
OD_CC: 20/30-2

## 2023-12-13 ENCOUNTER — FOLLOW UP (OUTPATIENT)
Dept: URBAN - METROPOLITAN AREA CLINIC 33 | Facility: CLINIC | Age: 88
End: 2023-12-13

## 2023-12-13 DIAGNOSIS — H35.3124: ICD-10-CM

## 2023-12-13 DIAGNOSIS — H43.393: ICD-10-CM

## 2023-12-13 DIAGNOSIS — H01.005: ICD-10-CM

## 2023-12-13 DIAGNOSIS — H35.3113: ICD-10-CM

## 2023-12-13 DIAGNOSIS — H40.1130: ICD-10-CM

## 2023-12-13 DIAGNOSIS — H35.3211: ICD-10-CM

## 2023-12-13 DIAGNOSIS — H16.142: ICD-10-CM

## 2023-12-13 DIAGNOSIS — H01.003: ICD-10-CM

## 2023-12-13 DIAGNOSIS — H01.006: ICD-10-CM

## 2023-12-13 DIAGNOSIS — H04.123: ICD-10-CM

## 2023-12-13 PROCEDURE — 92250 FUNDUS PHOTOGRAPHY W/I&R: CPT

## 2023-12-13 PROCEDURE — 67028 INJECTION EYE DRUG: CPT

## 2023-12-13 PROCEDURE — 92014 COMPRE OPH EXAM EST PT 1/>: CPT

## 2023-12-13 PROCEDURE — 92134 CPTRZ OPH DX IMG PST SGM RTA: CPT

## 2023-12-13 ASSESSMENT — TONOMETRY
OD_IOP_MMHG: 11
OS_IOP_MMHG: 8

## 2023-12-13 ASSESSMENT — VISUAL ACUITY
OS_SC: CF 1FT
OD_SC: 20/30+2

## 2024-01-19 ENCOUNTER — CLINIC PROCEDURE ONLY (OUTPATIENT)
Dept: URBAN - METROPOLITAN AREA CLINIC 33 | Facility: CLINIC | Age: 89
End: 2024-01-19

## 2024-01-19 DIAGNOSIS — H35.3124: ICD-10-CM

## 2024-01-19 DIAGNOSIS — H35.3211: ICD-10-CM

## 2024-01-19 PROCEDURE — 92134 CPTRZ OPH DX IMG PST SGM RTA: CPT

## 2024-01-19 PROCEDURE — 67028 INJECTION EYE DRUG: CPT

## 2024-01-19 PROCEDURE — 92250 FUNDUS PHOTOGRAPHY W/I&R: CPT

## 2024-01-19 ASSESSMENT — TONOMETRY
OS_IOP_MMHG: 10
OD_IOP_MMHG: 10

## 2024-01-25 ENCOUNTER — EMERGENCY VISIT (OUTPATIENT)
Dept: URBAN - METROPOLITAN AREA CLINIC 33 | Facility: CLINIC | Age: 89
End: 2024-01-25

## 2024-01-25 DIAGNOSIS — H04.123: ICD-10-CM

## 2024-01-25 DIAGNOSIS — H35.3124: ICD-10-CM

## 2024-01-25 DIAGNOSIS — H01.005: ICD-10-CM

## 2024-01-25 DIAGNOSIS — H43.393: ICD-10-CM

## 2024-01-25 DIAGNOSIS — H11.31: ICD-10-CM

## 2024-01-25 DIAGNOSIS — H01.006: ICD-10-CM

## 2024-01-25 DIAGNOSIS — H35.3211: ICD-10-CM

## 2024-01-25 DIAGNOSIS — H40.1130: ICD-10-CM

## 2024-01-25 DIAGNOSIS — H01.003: ICD-10-CM

## 2024-01-25 DIAGNOSIS — H35.3113: ICD-10-CM

## 2024-01-25 DIAGNOSIS — H16.142: ICD-10-CM

## 2024-01-25 PROCEDURE — 92012 INTRM OPH EXAM EST PATIENT: CPT

## 2024-01-25 ASSESSMENT — VISUAL ACUITY
OD_CC: 20/25
OS_CC: CF 3FT

## 2024-01-25 ASSESSMENT — TONOMETRY
OD_IOP_MMHG: 13
OS_IOP_MMHG: 08

## 2024-02-23 ENCOUNTER — FOLLOW UP (OUTPATIENT)
Dept: URBAN - METROPOLITAN AREA CLINIC 33 | Facility: CLINIC | Age: 89
End: 2024-02-23

## 2024-02-23 DIAGNOSIS — H35.3124: ICD-10-CM

## 2024-02-23 DIAGNOSIS — H35.3211: ICD-10-CM

## 2024-02-23 PROCEDURE — 92250 FUNDUS PHOTOGRAPHY W/I&R: CPT

## 2024-02-23 PROCEDURE — 92134 CPTRZ OPH DX IMG PST SGM RTA: CPT

## 2024-02-23 PROCEDURE — 67028 INJECTION EYE DRUG: CPT

## 2024-02-23 ASSESSMENT — TONOMETRY
OD_IOP_MMHG: 8
OS_IOP_MMHG: 13

## 2024-03-18 ENCOUNTER — EMERGENCY VISIT (OUTPATIENT)
Dept: URBAN - METROPOLITAN AREA CLINIC 33 | Facility: CLINIC | Age: 89
End: 2024-03-18

## 2024-03-18 DIAGNOSIS — H02.834: ICD-10-CM

## 2024-03-18 DIAGNOSIS — H01.006: ICD-10-CM

## 2024-03-18 DIAGNOSIS — H35.3211: ICD-10-CM

## 2024-03-18 DIAGNOSIS — H35.3124: ICD-10-CM

## 2024-03-18 DIAGNOSIS — S00.10XS: ICD-10-CM

## 2024-03-18 DIAGNOSIS — H01.005: ICD-10-CM

## 2024-03-18 DIAGNOSIS — Z96.1: ICD-10-CM

## 2024-03-18 DIAGNOSIS — H02.831: ICD-10-CM

## 2024-03-18 DIAGNOSIS — H43.393: ICD-10-CM

## 2024-03-18 DIAGNOSIS — H04.123: ICD-10-CM

## 2024-03-18 DIAGNOSIS — H16.142: ICD-10-CM

## 2024-03-18 DIAGNOSIS — H40.1130: ICD-10-CM

## 2024-03-18 DIAGNOSIS — H35.3113: ICD-10-CM

## 2024-03-18 DIAGNOSIS — H01.003: ICD-10-CM

## 2024-03-18 DIAGNOSIS — H11.31: ICD-10-CM

## 2024-03-18 PROCEDURE — 92250 FUNDUS PHOTOGRAPHY W/I&R: CPT

## 2024-03-18 PROCEDURE — 92012 INTRM OPH EXAM EST PATIENT: CPT

## 2024-03-18 ASSESSMENT — TONOMETRY
OD_IOP_MMHG: 13
OS_IOP_MMHG: 10

## 2024-03-18 ASSESSMENT — VISUAL ACUITY
OD_CC: 20/25+1
OS_CC: CF 2FT

## 2024-03-27 ENCOUNTER — CLINIC PROCEDURE ONLY (OUTPATIENT)
Dept: URBAN - METROPOLITAN AREA CLINIC 33 | Facility: CLINIC | Age: 89
End: 2024-03-27

## 2024-03-27 DIAGNOSIS — H35.3124: ICD-10-CM

## 2024-03-27 DIAGNOSIS — H35.3211: ICD-10-CM

## 2024-03-27 PROCEDURE — 67028 INJECTION EYE DRUG: CPT

## 2024-03-27 PROCEDURE — 92250 FUNDUS PHOTOGRAPHY W/I&R: CPT

## 2024-03-27 ASSESSMENT — TONOMETRY
OS_IOP_MMHG: 11
OD_IOP_MMHG: 18

## 2024-04-24 ENCOUNTER — CLINIC PROCEDURE ONLY (OUTPATIENT)
Dept: URBAN - METROPOLITAN AREA CLINIC 33 | Facility: CLINIC | Age: 89
End: 2024-04-24

## 2024-04-24 DIAGNOSIS — H35.3124: ICD-10-CM

## 2024-04-24 DIAGNOSIS — H35.3211: ICD-10-CM

## 2024-04-24 PROCEDURE — 92250 FUNDUS PHOTOGRAPHY W/I&R: CPT | Mod: 59

## 2024-04-24 PROCEDURE — 92134 CPTRZ OPH DX IMG PST SGM RTA: CPT

## 2024-04-24 PROCEDURE — 67028 INJECTION EYE DRUG: CPT

## 2024-12-17 ENCOUNTER — COMPREHENSIVE EXAM (OUTPATIENT)
Age: 89
End: 2024-12-17

## 2024-12-17 DIAGNOSIS — Z96.1: ICD-10-CM

## 2024-12-17 DIAGNOSIS — H16.121: ICD-10-CM

## 2024-12-17 DIAGNOSIS — H35.3113: ICD-10-CM

## 2024-12-17 DIAGNOSIS — H02.831: ICD-10-CM

## 2024-12-17 DIAGNOSIS — H43.392: ICD-10-CM

## 2024-12-17 DIAGNOSIS — H35.3124: ICD-10-CM

## 2024-12-17 DIAGNOSIS — H02.834: ICD-10-CM

## 2024-12-17 DIAGNOSIS — H35.3211: ICD-10-CM

## 2024-12-17 DIAGNOSIS — H40.1130: ICD-10-CM

## 2024-12-17 DIAGNOSIS — H04.123: ICD-10-CM

## 2024-12-17 PROCEDURE — 92250 FUNDUS PHOTOGRAPHY W/I&R: CPT

## 2024-12-17 PROCEDURE — 92134 CPTRZ OPH DX IMG PST SGM RTA: CPT

## 2024-12-17 PROCEDURE — 92014 COMPRE OPH EXAM EST PT 1/>: CPT | Mod: 25

## 2024-12-17 PROCEDURE — 67028 INJECTION EYE DRUG: CPT

## 2024-12-17 RX ORDER — FLUOROMETHOLONE 1 MG/ML: 1 SUSPENSION/ DROPS OPHTHALMIC TWICE A DAY

## 2025-01-27 ENCOUNTER — FOLLOW UP WITH CLINIC PROCEDURE (OUTPATIENT)
Age: OVER 89
End: 2025-01-27

## 2025-01-27 DIAGNOSIS — H35.3124: ICD-10-CM

## 2025-01-27 DIAGNOSIS — H35.3211: ICD-10-CM

## 2025-01-27 PROCEDURE — 92134 CPTRZ OPH DX IMG PST SGM RTA: CPT

## 2025-01-27 PROCEDURE — 67028 INJECTION EYE DRUG: CPT

## 2025-01-27 PROCEDURE — 92250 FUNDUS PHOTOGRAPHY W/I&R: CPT

## 2025-02-26 ENCOUNTER — CLINIC PROCEDURE ONLY (OUTPATIENT)
Age: OVER 89
End: 2025-02-26

## 2025-02-26 DIAGNOSIS — H35.3211: ICD-10-CM

## 2025-02-26 DIAGNOSIS — H35.3124: ICD-10-CM

## 2025-02-26 PROCEDURE — 92134 CPTRZ OPH DX IMG PST SGM RTA: CPT

## 2025-02-26 PROCEDURE — 67028 INJECTION EYE DRUG: CPT

## 2025-02-26 PROCEDURE — 92250 FUNDUS PHOTOGRAPHY W/I&R: CPT | Mod: 59

## 2025-04-23 ENCOUNTER — CLINIC PROCEDURE ONLY (OUTPATIENT)
Age: OVER 89
End: 2025-04-23

## 2025-04-23 DIAGNOSIS — H35.3124: ICD-10-CM

## 2025-04-23 DIAGNOSIS — H35.3211: ICD-10-CM

## 2025-04-23 PROCEDURE — 92134 CPTRZ OPH DX IMG PST SGM RTA: CPT

## 2025-04-23 PROCEDURE — 92250 FUNDUS PHOTOGRAPHY W/I&R: CPT | Mod: 59

## 2025-04-23 PROCEDURE — 67028 INJECTION EYE DRUG: CPT
